# Patient Record
(demographics unavailable — no encounter records)

---

## 2025-01-14 NOTE — PHYSICAL EXAM
[Normal Breath Sounds] : Normal breath sounds [No Rash or Lesion] : No rash or lesion [Alert] : alert [Calm] : calm [JVD] : no jugular venous distention  [de-identified] : Healthy [de-identified] : Normal [de-identified] : Mildly protuberant abdomen [de-identified] : Normal testicles [de-identified] : Right inguinal hernia, reducible

## 2025-01-14 NOTE — CONSULT LETTER
[Dear  ___] : Dear ~HAMLET, [Courtesy Letter:] : I had the pleasure of seeing your patient, [unfilled], in my office today. [Please see my note below.] : Please see my note below. [Consult Closing:] : Thank you very much for allowing me to participate in the care of this patient.  If you have any questions, please do not hesitate to contact me. [FreeTextEntry3] : Respectfully,  Prashanth Lainez M.D., FACS

## 2025-01-14 NOTE — ASSESSMENT
[FreeTextEntry1] : Bon is a pleasant 38-year-old [place] with no significant past medical history who presents to the office with pain and swelling in the right groin suspicious for a hernia.  His symptoms began on November 13, 2024 when he slipped at work while carrying a heavy object resulting in severe pain in the right groin followed shortly thereafter by swelling who underwent urgent evaluation and was found to have a right inguinal hernia and a simultaneous complicated kidney stone which was addressed first.  His symptoms persisted in the right groin prompting a recent ultrasound confirming a right inguinal hernia now resulting in surgical evaluation.  Physical examination demonstrates a large tender bulge in the right groin which is reducible with minimal to moderate difficulty consistent with a large symptomatic protruding right inguinal hernia warranting surgical repair.  There is no evidence of incarceration or strangulation, and the patient denies any symptoms of obstruction.  Examination of his left groin demonstrates some mild weakness but no obvious hernia.  Both testicles are normal.  His umbilical examination is unremarkable.  His current BMI is 27.  I explained the pros and cons of surgery, as well as all risks, benefits, indications and alternatives of the procedure and the patient understood and agreed.  Bon was scheduled for the repair of his work-related right inguinal hernia with mesh on Friday, February 21, 2025 under LOCAL with IV SEDATION at the [facility] with presurgical testing waived.  He was encouraged to avoid heavy lifting and strenuous activity in the interim (especially at work), of course.

## 2025-03-03 NOTE — CONSULT LETTER
[Dear  ___] : Dear ~HAMLET, [Courtesy Letter:] : I had the pleasure of seeing your patient, [unfilled], in my office today. [Please see my note below.] : Please see my note below. [Consult Closing:] : Thank you very much for allowing me to participate in the care of this patient.  If you have any questions, please do not hesitate to contact me. [Sincerely,] : Sincerely, [FreeTextEntry3] :     Phylicia Sousa PA-C, MSPAS

## 2025-03-03 NOTE — ASSESSMENT
[FreeTextEntry1] : MEKA DAMON underwent a large direct right inguinal hernia repair with mesh with Dr. Lainez on 2/21/25 under local IV sedation without any problems or complications. His wound is clean, dry and intact. There is no evidence of erythema, seroma formation or infection. He is tolerating a diet and having normal bowel movements. He denies any significant postoperative pain or discomfort at this time.   He was counseled and reassured. MEKA was discharged from the office with no specific follow up necessary, but he knows to avoid any heavy lifting or strenuous activity for the next several weeks.

## 2025-05-15 NOTE — PHYSICAL EXAM
[JVD] : no jugular venous distention  [Respiratory Effort] : normal respiratory effort [No Rash or Lesion] : No rash or lesion [Alert] : alert [Calm] : calm [de-identified] : healthy [de-identified] : normal [de-identified] : no hernia recurrence

## 2025-05-15 NOTE — ASSESSMENT
[FreeTextEntry1] : Bon presents back to the office approximately 12 weeks after the repair of his large direct right inguinal hernia with mesh with concerns about still having intermittent discomfort in this area and hoping to be able to return to full duty assignment at work. He has slowly restarted his exercise routine and has had no issues with activity.  Physical examination demonstrates a well-healing scar in the right groin region with no evidence of hernia recurrence or delayed wound complications.  Bon was counseled and reassured. I believe he can safely return to work at this time. He knows that if he begins to experience pain or worsening discomfort, he should return to the office but otherwise is discharged from the office with no specific follow up necessary.